# Patient Record
Sex: FEMALE | Race: WHITE | NOT HISPANIC OR LATINO | Employment: OTHER | URBAN - NONMETROPOLITAN AREA
[De-identification: names, ages, dates, MRNs, and addresses within clinical notes are randomized per-mention and may not be internally consistent; named-entity substitution may affect disease eponyms.]

---

## 2023-11-07 PROBLEM — D64.9 ANEMIA: Status: ACTIVE | Noted: 2023-11-07

## 2023-11-07 PROBLEM — H25.11 AGE-RELATED NUCLEAR CATARACT OF RIGHT EYE: Status: ACTIVE | Noted: 2023-11-07

## 2023-11-07 PROBLEM — H55.09 DOWNBEAT NYSTAGMUS: Status: ACTIVE | Noted: 2023-11-07

## 2023-11-07 PROBLEM — G25.82 STIFF PERSON SYNDROME: Status: ACTIVE | Noted: 2023-11-07

## 2023-11-07 PROBLEM — R00.2 HEART PALPITATIONS: Status: ACTIVE | Noted: 2023-11-07

## 2023-11-07 PROBLEM — T75.3XXA MOTION SICKNESS: Status: ACTIVE | Noted: 2023-11-07

## 2023-11-07 PROBLEM — F41.9 ANXIETY: Status: ACTIVE | Noted: 2023-11-07

## 2023-11-07 PROBLEM — H02.409 PTOSIS: Status: ACTIVE | Noted: 2023-11-07

## 2023-11-07 PROBLEM — H51.8 DIVERGENCE INSUFFICIENCY: Status: ACTIVE | Noted: 2023-11-07

## 2023-11-07 PROBLEM — R42 DIZZINESS: Status: ACTIVE | Noted: 2023-11-07

## 2023-11-07 PROBLEM — H04.123 BILATERAL DRY EYES: Status: ACTIVE | Noted: 2023-11-07

## 2023-11-08 ENCOUNTER — APPOINTMENT (OUTPATIENT)
Dept: NEUROLOGY | Facility: CLINIC | Age: 68
End: 2023-11-08

## 2023-11-08 RX ORDER — CLONAZEPAM 0.5 MG/1
1 TABLET ORAL 2 TIMES DAILY
COMMUNITY
End: 2024-04-09 | Stop reason: SDUPTHER

## 2023-11-08 RX ORDER — ONDANSETRON HYDROCHLORIDE 8 MG/1
8 TABLET, FILM COATED ORAL EVERY 8 HOURS PRN
COMMUNITY

## 2023-11-08 RX ORDER — METOPROLOL TARTRATE 50 MG/1
50 TABLET ORAL 2 TIMES DAILY
COMMUNITY

## 2023-11-08 RX ORDER — ERGOCALCIFEROL 1.25 MG/1
1.25 CAPSULE ORAL
COMMUNITY
Start: 2023-01-09

## 2023-11-08 RX ORDER — ASPIRIN 81 MG
TABLET, DELAYED RELEASE (ENTERIC COATED) ORAL
COMMUNITY

## 2023-11-08 RX ORDER — BLOOD-GLUCOSE SENSOR
EACH MISCELLANEOUS
COMMUNITY
Start: 2023-10-20

## 2023-11-08 RX ORDER — ACETAMINOPHEN 500 MG
TABLET ORAL
COMMUNITY

## 2023-11-08 RX ORDER — SCOLOPAMINE TRANSDERMAL SYSTEM 1 MG/1
1 PATCH, EXTENDED RELEASE TRANSDERMAL
COMMUNITY
Start: 2021-09-08

## 2023-11-08 RX ORDER — CLOPIDOGREL BISULFATE 75 MG/1
75 TABLET ORAL DAILY
COMMUNITY

## 2023-11-08 RX ORDER — LEVOTHYROXINE SODIUM 100 UG/1
TABLET ORAL
COMMUNITY

## 2023-11-08 RX ORDER — LEVOTHYROXINE SODIUM 88 UG/1
88 TABLET ORAL DAILY
COMMUNITY
Start: 2023-10-23

## 2023-11-08 RX ORDER — ROSUVASTATIN CALCIUM 20 MG/1
20 TABLET, COATED ORAL NIGHTLY
COMMUNITY

## 2023-11-08 RX ORDER — MULTIVIT-MIN/IRON/FOLIC ACID/K 18-600-40
CAPSULE ORAL
COMMUNITY

## 2023-11-08 RX ORDER — SERTRALINE HYDROCHLORIDE 25 MG/1
25 TABLET, FILM COATED ORAL DAILY
COMMUNITY

## 2023-11-08 RX ORDER — PEN NEEDLE, DIABETIC 31 GX5/16"
NEEDLE, DISPOSABLE MISCELLANEOUS
COMMUNITY
Start: 2023-10-23

## 2023-11-08 RX ORDER — EZETIMIBE 10 MG/1
10 TABLET ORAL DAILY
COMMUNITY

## 2023-11-08 RX ORDER — NYSTATIN 100000 [USP'U]/G
POWDER TOPICAL 3 TIMES DAILY PRN
COMMUNITY

## 2023-11-08 RX ORDER — NAPROXEN SODIUM 220 MG/1
TABLET, FILM COATED ORAL DAILY
COMMUNITY

## 2023-11-08 RX ORDER — INSULIN LISPRO 100 [IU]/ML
INJECTION, SOLUTION INTRAVENOUS; SUBCUTANEOUS
COMMUNITY
Start: 2023-10-23

## 2023-11-08 RX ORDER — NYSTATIN AND TRIAMCINOLONE ACETONIDE 100000; 1 [USP'U]/G; MG/G
CREAM TOPICAL
COMMUNITY
Start: 2023-01-04

## 2023-11-08 RX ORDER — BACLOFEN 10 MG/1
10 TABLET ORAL 3 TIMES DAILY
COMMUNITY
End: 2024-01-05 | Stop reason: SDUPTHER

## 2023-11-08 RX ORDER — BLOOD-GLUCOSE TRANSMITTER
EACH MISCELLANEOUS
COMMUNITY
Start: 2023-04-19

## 2023-11-08 RX ORDER — ASPIRIN 325 MG
50000 TABLET, DELAYED RELEASE (ENTERIC COATED) ORAL
COMMUNITY
Start: 2023-05-23

## 2023-11-08 RX ORDER — LEVOTHYROXINE SODIUM 75 UG/1
TABLET ORAL
COMMUNITY
Start: 2022-11-13

## 2023-11-08 RX ORDER — UBIDECARENONE 75 MG
1 CAPSULE ORAL DAILY
COMMUNITY

## 2023-11-08 RX ORDER — ONDANSETRON 8 MG/1
1-2 TABLET, ORALLY DISINTEGRATING ORAL AS NEEDED
COMMUNITY
Start: 2016-11-03

## 2023-11-08 RX ORDER — INSULIN GLARGINE 100 [IU]/ML
INJECTION, SOLUTION SUBCUTANEOUS
COMMUNITY
Start: 2023-10-23

## 2023-11-13 ENCOUNTER — TELEPHONE (OUTPATIENT)
Dept: HEMATOLOGY/ONCOLOGY | Facility: CLINIC | Age: 68
End: 2023-11-13
Payer: COMMERCIAL

## 2023-11-13 DIAGNOSIS — H55.09 DOWNBEAT NYSTAGMUS: ICD-10-CM

## 2023-11-13 DIAGNOSIS — R42 DIZZINESS: Primary | ICD-10-CM

## 2023-11-13 NOTE — TELEPHONE ENCOUNTER
Patient called office stating she will be leaving [place] soon, to spend the winter in [place], [place]. She will return home in April. She has found a Neurologist in [place] who will treat and follow her, however, he is requesting a referral from you. Please send this referral to José Antonio Hawkins MD at fax. #  [phone]. His telephone # is [phone] should you have any questions.

## 2023-12-18 ENCOUNTER — TELEPHONE (OUTPATIENT)
Dept: HEMATOLOGY/ONCOLOGY | Facility: CLINIC | Age: 68
End: 2023-12-18
Payer: COMMERCIAL

## 2023-12-18 DIAGNOSIS — H55.09 DOWNBEAT NYSTAGMUS: ICD-10-CM

## 2023-12-18 DIAGNOSIS — R42 DIZZINESS: Primary | ICD-10-CM

## 2024-01-05 ENCOUNTER — TELEPHONE (OUTPATIENT)
Dept: NEUROLOGY | Facility: CLINIC | Age: 69
End: 2024-01-05
Payer: COMMERCIAL

## 2024-01-05 DIAGNOSIS — H55.09 DOWNBEAT NYSTAGMUS: Primary | ICD-10-CM

## 2024-01-05 DIAGNOSIS — H55.09 DOWNBEAT NYSTAGMUS: ICD-10-CM

## 2024-01-05 RX ORDER — BACLOFEN 10 MG/1
10 TABLET ORAL 3 TIMES DAILY
Qty: 270 TABLET | Refills: 1 | OUTPATIENT
Start: 2024-01-05 | End: 2024-07-03

## 2024-01-05 RX ORDER — BACLOFEN 10 MG/1
10 TABLET ORAL 3 TIMES DAILY
Qty: 270 TABLET | Refills: 1 | Status: SHIPPED | OUTPATIENT
Start: 2024-01-05 | End: 2024-04-09 | Stop reason: SDUPTHER

## 2024-01-05 NOTE — TELEPHONE ENCOUNTER
Records sent to [facility] to be sent to Dr. José Antonio Hawkins-(p)[phone] (f)[phone] Patient Att : Radha / also faxed referral

## 2024-04-09 DIAGNOSIS — H55.09 DOWNBEAT NYSTAGMUS: ICD-10-CM

## 2024-04-09 RX ORDER — CLONAZEPAM 0.5 MG/1
1 TABLET ORAL 2 TIMES DAILY
Qty: 360 TABLET | Refills: 0 | Status: SHIPPED | OUTPATIENT
Start: 2024-04-09

## 2024-04-09 RX ORDER — CLONAZEPAM 0.5 MG/1
1 TABLET ORAL 2 TIMES DAILY
Status: CANCELLED | OUTPATIENT
Start: 2024-04-09

## 2024-04-09 RX ORDER — BACLOFEN 10 MG/1
10 TABLET ORAL 3 TIMES DAILY
Qty: 270 TABLET | Refills: 1 | Status: SHIPPED | OUTPATIENT
Start: 2024-04-09

## 2024-04-09 NOTE — TELEPHONE ENCOUNTER
PT HAD TO RS APPT W/ DR. SANCHEZ FOR 04/10/24,  HAS BEEN ILL AND JUST HAD MAJOR SURGERY. NOW SCHEDULED FOR 07/10/24. HAS REQUESTED THE FOLLOWING 2 REFILLS TO BE SENT TO [facility] ([phone])    BACLIFEN  CLINAZEPAM

## 2024-04-09 NOTE — TELEPHONE ENCOUNTER
Please let patient know she needs UA which she can have done at any  facility for screening for benzo use and she can sign contract with Dr. Douglas at next apt. I do not see a controlled substance agreement, that has to be done in person at next visit. Needs to be done yearly to prescribe. Thanks

## 2024-04-10 ENCOUNTER — APPOINTMENT (OUTPATIENT)
Dept: NEUROLOGY | Facility: CLINIC | Age: 69
End: 2024-04-10
Payer: MEDICARE

## 2024-04-11 NOTE — TELEPHONE ENCOUNTER
I spoke with patient, let her know UA needed and that she also needs to do contract for clonazepam when she comes in, she agrees.

## 2024-05-30 ENCOUNTER — TELEPHONE (OUTPATIENT)
Dept: NEUROLOGY | Facility: CLINIC | Age: 69
End: 2024-05-30
Payer: COMMERCIAL

## 2024-07-10 ENCOUNTER — APPOINTMENT (OUTPATIENT)
Dept: NEUROLOGY | Facility: CLINIC | Age: 69
End: 2024-07-10
Payer: COMMERCIAL

## 2024-07-19 ENCOUNTER — APPOINTMENT (OUTPATIENT)
Dept: NEUROLOGY | Facility: CLINIC | Age: 69
End: 2024-07-19
Payer: COMMERCIAL

## 2024-07-19 DIAGNOSIS — Z51.81 THERAPEUTIC DRUG MONITORING: ICD-10-CM

## 2024-07-19 DIAGNOSIS — H55.09 DOWNBEAT NYSTAGMUS: Primary | ICD-10-CM

## 2024-07-19 DIAGNOSIS — R42 PERSISTENT POSTURAL-PERCEPTUAL DIZZINESS: ICD-10-CM

## 2024-07-19 PROCEDURE — 99215 OFFICE O/P EST HI 40 MIN: CPT | Performed by: NURSE PRACTITIONER

## 2024-07-19 PROCEDURE — 1159F MED LIST DOCD IN RCRD: CPT | Performed by: NURSE PRACTITIONER

## 2024-07-19 PROCEDURE — 1036F TOBACCO NON-USER: CPT | Performed by: NURSE PRACTITIONER

## 2024-07-19 PROCEDURE — 1160F RVW MEDS BY RX/DR IN RCRD: CPT | Performed by: NURSE PRACTITIONER

## 2024-07-19 RX ORDER — CLONAZEPAM 0.5 MG/1
0.5 TABLET ORAL 3 TIMES DAILY
Qty: 270 TABLET | Refills: 0 | Status: SHIPPED | OUTPATIENT
Start: 2024-07-19

## 2024-07-19 RX ORDER — CHOLECALCIFEROL (VITAMIN D3) 50 MCG
50 TABLET ORAL DAILY
COMMUNITY

## 2024-07-19 RX ORDER — SERTRALINE HYDROCHLORIDE 25 MG/1
25 TABLET, FILM COATED ORAL DAILY
Qty: 90 TABLET | Refills: 3 | Status: SHIPPED | OUTPATIENT
Start: 2024-07-19

## 2024-07-19 RX ORDER — BACLOFEN 10 MG/1
10 TABLET ORAL 3 TIMES DAILY
Qty: 270 TABLET | Refills: 3 | Status: SHIPPED | OUTPATIENT
Start: 2024-07-19

## 2024-07-19 NOTE — PROGRESS NOTES
"Subjective     Rina Thomas is a 69 y.o. year old female who presents with Dizziness, here for follow up visit.  A video visit between the patient (at the originating site) and the provider (at the distant site) was utilized to provide this telehealth service.     Verbal consent was requested and obtained from the patient on this date for a telehealth visit. The patient was informed about the telehealth clinical encounter including benefits to avoiding travel, limitations to the assessment, and billing for the service. In person care may be recommended if needed. Telehealth sessions are not being recorded and personal health information is protected. All questions were answered and verbal informal consent was obtained from the patient to proceed.      HPI  Last visit 5/2023 with Dr. Douglas, Zoloft was started for PDDD and baclofen and clonazepam continued.    She said she is miserable, was rescheduled when he was out of town, and she had to cancel apt in Dec because her  had surgery and many issues and she and he cannot drive.     Dizziness \"is driving me insane\" is 24/7 and unchanged and her stiffness and inability to move them/lock up.  Dizziness is the same as prior to starting clonazepam and baclofen but not helping it either.     She has a difficult time describing a feeling that goes from her head down to her feet when laying in her bed, it is scary, weird feeling. She has d/w Dr. Douglas and says there is never nothing to be done. She wanted an MRI brain to be sure there was nothing in her head. She says something has to be wrong in her head.  She thinks may have started around 2017 but not sure, awhile. She sits up afterwards but stays in bed, no change when she gets OOB.     Nystagmus is not any better with meds. She feels like she cannot focus well on faces or things. Her eyes feel like they bounce/shake.   Follow up eye apt pending.   She did try prisms with Dr. العلي.     She is so stiff she " "cannot walk without walker, walker at all times, no falls since last year.   Did not do PT, she is not sure it helps as she feels she cannot stand by herself without holding onto things and cannot do the exercises well as she is off balance and legs freeze.   If she is startled her entire body freezes and she cannot move.     She said Dr. Scott is aware she can mvoe her legs well and can ride a recumbant bike but standing she can barely move well    Stopped crestor for leg pain.       Meds:  Baclofen 10 mg 3 times a day-Am-2pm-bed  Clonazepam 0.5 mg 1 tab 3 times a day--Am-2pm-bed    Prior meds  Scopolomine patch- not helpful                    Current Outpatient Medications:     acetaminophen (Tylenol) 500 mg tablet, Take by mouth., Disp: , Rfl:     ascorbic acid, vitamin C, 500 mg capsule, Take by mouth., Disp: , Rfl:     aspirin 81 mg chewable tablet, Chew once daily., Disp: , Rfl:     baclofen (Lioresal) 10 mg tablet, Take 1 tablet (10 mg) by mouth 3 times a day., Disp: 270 tablet, Rfl: 3    BD Ultra-Fine Mini Pen Needle 31 gauge x 3/16\" needle, USE AS DIRECTED WITH insulin pen FOUR TIMES DAILY, Disp: , Rfl:     cholecalciferol (Vitamin D3) 50 MCG (2000 UT) tablet, Take 1 tablet (50 mcg) by mouth once daily., Disp: , Rfl:     clonazePAM (KlonoPIN) 0.5 mg tablet, Take 1 tablet (0.5 mg) by mouth 3 times a day., Disp: 270 tablet, Rfl: 0    clopidogrel (Plavix) 75 mg tablet, Take 1 tablet (75 mg) by mouth once daily., Disp: , Rfl:     Dexcom G7 Sensor device, USE ONE SENSORS EVERY 10 DAYS, IDDM, E 10.42, Disp: , Rfl:     docusate sodium (Stool Softener) 100 mg tablet, Take by mouth., Disp: , Rfl:     ezetimibe (Zetia) 10 mg tablet, Take 1 tablet (10 mg) by mouth once daily., Disp: , Rfl:     HumaLOG KwikPen Insulin 100 unit/mL injection, Inject subcutaneously 5 units at breakfast, 7 units at dinner, plus sliding scale up to 20 units. TDD 25 units, Disp: , Rfl:     Lantus Solostar U-100 Insulin 100 unit/mL (3 mL) " pen, Inject subcutaneously 21 units daily., Disp: , Rfl:     levothyroxine (Synthroid, Levoxyl) 100 mcg tablet, once daily monday through saturday, Disp: , Rfl:     metoprolol tartrate (Lopressor) 50 mg tablet, Take 1 tablet by mouth 2 times a day., Disp: , Rfl:     nystatin-triamcinolone (Mycolog II) cream, APPLY TO THE AFFECTED AREA(S) TWICE DAILY for 7-14 days then AS NEEDED for rash, Disp: , Rfl:     ondansetron (Zofran) 8 mg tablet, Take 1 tablet (8 mg) by mouth every 8 hours if needed for nausea or vomiting., Disp: , Rfl:     rosuvastatin (Crestor) 20 mg tablet, Take 1 tablet (20 mg) by mouth once daily at bedtime., Disp: , Rfl:     sertraline (Zoloft) 25 mg tablet, Take 1 tablet (25 mg) by mouth once daily., Disp: 90 tablet, Rfl: 3       Objective   There were no vitals filed for this visit.              Physical Exam  Awake, alert, answering/asking questions appropriately   Able to stand but difficulty getting going with walking  Lifts legs in leg march seated easily      Assessment/Plan   Ms. Rina Thomas is a 69 y.o. F seen virtually for follow up today. Last visit with Dr. Douglas May 2023.     In chart review, she has a hx of anti-Norberto antibody and downbeating nystagmus, stiff person syndrome previously noted, on clonazepam and baclofen. Her eyes have difficulty focusing and she feels very stiff in the legs only when trying to walk. Overall unchanged but not better with meds. She uses a walker, no falls for >1yr, did not do PT as she did not find it helpful before.  She cannot have IVIG in the future due to previous MI x2.     PPPD: chronic, longstanding, overall unchanged. Last visit, Zoloft ordered but she did not understand what it was for or take it. TOday we discussed starting it for PPPD, potential SE and she is agreeable. Also offered CBT which she is also agreeable to.     She is asking about repeating MRI of the brain but overall denies change in symptoms over the years, just no improvement.  Discussed an inperson visit may better see if there is a reason to re-do the MRI brain.     Today, benzodiazepine contract reviewed in detail--verbal understanding of the contract and she will sign in person when Dr. Douglas is there  at [place] and get UA screening at the same time.      Diagnoses and all orders for this visit:  Downbeat nystagmus  -     clonazePAM (KlonoPIN) 0.5 mg tablet; Take 1 tablet (0.5 mg) by mouth 3 times a day.  -     baclofen (Lioresal) 10 mg tablet; Take 1 tablet (10 mg) by mouth 3 times a day.  Persistent postural-perceptual dizziness  -     sertraline (Zoloft) 25 mg tablet; Take 1 tablet (25 mg) by mouth once daily.  -     Referral to Psychology; Future  Therapeutic drug monitoring  -     Opiate/Opioid/Benzo Prescription Compliance; Future        #Psychology consult for cognitive behavioral therapy for PPPD     #Start sertraline 25mg daily for the PPPD  Side effects include but are not limited to: suicidality/suicidal thoughts, nausea, diarrhea, fatigue, insomnia. If these or any other side effects occur please let us know.    #To continue prescribing clonazepam   Urine test for monitoring while on clonazepam  Also sign benzo contract at that time---please coordinate with [facility].     #Continue baclofen and clonazepam for downbeating nystagmus    #Next available in person with Dr. Shaikh VO, LOS Quinn, personally performed  a medically appropriate exam, discussion of care/treatment options taking a total time of 57 minutes for today's visit.     Elier Quinn NP-C  Adult/Gerontological Nurse Practitioner   [facility], Department of Neurology  [facility] [place]  [address] [place] [address] [place] [address]  Phone: [phone]  Fax: [phone]

## 2024-07-23 ENCOUNTER — LAB (OUTPATIENT)
Dept: LAB | Facility: LAB | Age: 69
End: 2024-07-23
Payer: MEDICARE

## 2024-07-23 DIAGNOSIS — Z51.81 THERAPEUTIC DRUG MONITORING: ICD-10-CM

## 2024-07-23 PROCEDURE — 80373 DRUG SCREENING TRAMADOL: CPT

## 2024-07-23 PROCEDURE — 80346 BENZODIAZEPINES1-12: CPT

## 2024-07-23 PROCEDURE — 80358 DRUG SCREENING METHADONE: CPT

## 2024-07-23 PROCEDURE — 80361 OPIATES 1 OR MORE: CPT

## 2024-07-23 PROCEDURE — 80354 DRUG SCREENING FENTANYL: CPT

## 2024-07-23 PROCEDURE — 80368 SEDATIVE HYPNOTICS: CPT

## 2024-07-23 PROCEDURE — 82570 ASSAY OF URINE CREATININE: CPT

## 2024-07-23 PROCEDURE — 80365 DRUG SCREENING OXYCODONE: CPT

## 2024-07-23 PROCEDURE — 80307 DRUG TEST PRSMV CHEM ANLYZR: CPT

## 2024-07-24 LAB
AMPHETAMINES UR QL SCN: NORMAL
BARBITURATES UR QL SCN: NORMAL
BZE UR QL SCN: NORMAL
CANNABINOIDS UR QL SCN: NORMAL
CREAT UR-MCNC: 195.3 MG/DL (ref 20–320)
PCP UR QL SCN: NORMAL

## 2024-07-26 LAB
1OH-MIDAZOLAM UR CFM-MCNC: <25 NG/ML
6MAM UR CFM-MCNC: <25 NG/ML
7AMINOCLONAZEPAM UR CFM-MCNC: 874 NG/ML
A-OH ALPRAZ UR CFM-MCNC: <25 NG/ML
ALPRAZ UR CFM-MCNC: <25 NG/ML
CHLORDIAZEP UR CFM-MCNC: <25 NG/ML
CLONAZEPAM UR CFM-MCNC: <25 NG/ML
CODEINE UR CFM-MCNC: <50 NG/ML
DIAZEPAM UR CFM-MCNC: <25 NG/ML
EDDP UR CFM-MCNC: <25 NG/ML
FENTANYL UR CFM-MCNC: <2.5 NG/ML
HYDROCODONE CTO UR CFM-MCNC: <25 NG/ML
HYDROMORPHONE UR CFM-MCNC: <25 NG/ML
LORAZEPAM UR CFM-MCNC: <25 NG/ML
METHADONE UR CFM-MCNC: <25 NG/ML
MIDAZOLAM UR CFM-MCNC: <25 NG/ML
MORPHINE UR CFM-MCNC: <50 NG/ML
NORDIAZEPAM UR CFM-MCNC: <25 NG/ML
NORFENTANYL UR CFM-MCNC: <2.5 NG/ML
NORHYDROCODONE UR CFM-MCNC: <25 NG/ML
NOROXYCODONE UR CFM-MCNC: <25 NG/ML
NORTRAMADOL UR-MCNC: <50 NG/ML
OXAZEPAM UR CFM-MCNC: <25 NG/ML
OXYCODONE UR CFM-MCNC: <25 NG/ML
OXYMORPHONE UR CFM-MCNC: <25 NG/ML
TEMAZEPAM UR CFM-MCNC: <25 NG/ML
TRAMADOL UR CFM-MCNC: <50 NG/ML
ZOLPIDEM UR CFM-MCNC: <25 NG/ML
ZOLPIDEM UR-MCNC: <25 NG/ML

## 2024-08-14 ENCOUNTER — OFFICE VISIT (OUTPATIENT)
Dept: NEUROLOGY | Facility: CLINIC | Age: 69
End: 2024-08-14
Payer: MEDICARE

## 2024-08-14 VITALS
BODY MASS INDEX: 23.2 KG/M2 | SYSTOLIC BLOOD PRESSURE: 133 MMHG | HEART RATE: 82 BPM | WEIGHT: 122.8 LBS | DIASTOLIC BLOOD PRESSURE: 73 MMHG | TEMPERATURE: 96.6 F | RESPIRATION RATE: 16 BRPM

## 2024-08-14 DIAGNOSIS — H55.09 DOWNBEAT NYSTAGMUS: Primary | ICD-10-CM

## 2024-08-14 PROCEDURE — 99214 OFFICE O/P EST MOD 30 MIN: CPT | Performed by: STUDENT IN AN ORGANIZED HEALTH CARE EDUCATION/TRAINING PROGRAM

## 2024-08-14 PROCEDURE — 1159F MED LIST DOCD IN RCRD: CPT | Performed by: STUDENT IN AN ORGANIZED HEALTH CARE EDUCATION/TRAINING PROGRAM

## 2024-08-14 PROCEDURE — 1126F AMNT PAIN NOTED NONE PRSNT: CPT | Performed by: STUDENT IN AN ORGANIZED HEALTH CARE EDUCATION/TRAINING PROGRAM

## 2024-08-14 RX ORDER — NITROGLYCERIN 0.4 MG/1
0.4 TABLET SUBLINGUAL EVERY 5 MIN PRN
COMMUNITY

## 2024-08-14 ASSESSMENT — ENCOUNTER SYMPTOMS: DIZZINESS: 1

## 2024-08-14 ASSESSMENT — PAIN SCALES - GENERAL: PAINLEVEL: 0-NO PAIN

## 2024-08-14 NOTE — PATIENT INSTRUCTIONS
Pleasure meeting you today. Sorry to see you have been dealing with so much. As discussed the plan is as follows:    - We ordered blood DARIANA levels. We will discuss possibility of getting PLEX if the levels have gone up.  -We placed a referral to [facility] for holistic approach to chronic disease  -Ok to stop Zoloft at this time  -Your urine clonazepam monitoring was good  -Continue taking Baclofen and Clonazepam as prescribed    Follow up in 3 months virtually with Elier

## 2024-08-14 NOTE — PROGRESS NOTES
"Subjective     Rina Thomas is a 69 y.o. year old female who presents with Dizziness, here for follow up visit.    Interval Hx     Reports feeling poorly. Feels legs and arms are getting weaker and stiffer. Arms are achy. Unsure if it is from using the walker. Feels dizzy all the time. Only time she is not dizzy is when she is in bed. Tried Zoloft but it worsened her dizziness so she stopped. Still taking clonazepam and baclofen as prescribed but is not sure if getting any benefit. Is not able to stand without holding onto something.    HPI  Last visit 5/2023 with Dr. Douglas, Zoloft was started for PDDD and baclofen and clonazepam continued.    She said she is miserable, was rescheduled when he was out of town, and she had to cancel apt in Dec because her  had surgery and many issues and she and he cannot drive.     Dizziness \"is driving me insane\" is 24/7 and unchanged and her stiffness and inability to move them/lock up.  Dizziness is the same as prior to starting clonazepam and baclofen but not helping it either.     She has a difficult time describing a feeling that goes from her head down to her feet when laying in her bed, it is scary, weird feeling. She has d/w Dr. Douglas and says there is never nothing to be done. She wanted an MRI brain to be sure there was nothing in her head. She says something has to be wrong in her head.  She thinks may have started around 2017 but not sure, awhile. She sits up afterwards but stays in bed, no change when she gets OOB.     Nystagmus is not any better with meds. She feels like she cannot focus well on faces or things. Her eyes feel like they bounce/shake.   Follow up eye apt pending.   She did try prisms with Dr. العلي.     She is so stiff she cannot walk without walker, walker at all times, no falls since last year.   Did not do PT, she is not sure it helps as she feels she cannot stand by herself without holding onto things and cannot do the exercises well as " "she is off balance and legs freeze.   If she is startled her entire body freezes and she cannot move.     She said Dr. Scott is aware she can mvoe her legs well and can ride a recumbant bike but standing she can barely move well    Stopped crestor for leg pain.       Meds:  Baclofen 10 mg 3 times a day-Am-2pm-bed  Clonazepam 0.5 mg 1 tab 3 times a day--Am-2pm-bed    Prior meds  Scopolomine patch- not helpful   Zoloft- Worsened dizziness      Current Outpatient Medications:     acetaminophen (Tylenol) 500 mg tablet, Take by mouth., Disp: , Rfl:     ascorbic acid, vitamin C, 500 mg capsule, Take by mouth., Disp: , Rfl:     aspirin 81 mg chewable tablet, Chew once daily., Disp: , Rfl:     baclofen (Lioresal) 10 mg tablet, Take 1 tablet (10 mg) by mouth 3 times a day., Disp: 270 tablet, Rfl: 3    BD Ultra-Fine Mini Pen Needle 31 gauge x 3/16\" needle, USE AS DIRECTED WITH insulin pen FOUR TIMES DAILY, Disp: , Rfl:     cholecalciferol (Vitamin D3) 50 MCG (2000 UT) tablet, Take 1 tablet (50 mcg) by mouth once daily., Disp: , Rfl:     clonazePAM (KlonoPIN) 0.5 mg tablet, Take 1 tablet (0.5 mg) by mouth 3 times a day., Disp: 270 tablet, Rfl: 0    clopidogrel (Plavix) 75 mg tablet, Take 1 tablet (75 mg) by mouth once daily., Disp: , Rfl:     Dexcom G7 Sensor device, USE ONE SENSORS EVERY 10 DAYS, IDDM, E 10.42, Disp: , Rfl:     docusate sodium (Stool Softener) 100 mg tablet, Take by mouth., Disp: , Rfl:     ezetimibe (Zetia) 10 mg tablet, Take 1 tablet (10 mg) by mouth once daily., Disp: , Rfl:     HumaLOG KwikPen Insulin 100 unit/mL injection, Inject subcutaneously 5 units at breakfast, 7 units at dinner, plus sliding scale up to 20 units. TDD 25 units, Disp: , Rfl:     Lantus Solostar U-100 Insulin 100 unit/mL (3 mL) pen, Inject subcutaneously 21 units daily., Disp: , Rfl:     levothyroxine (Synthroid, Levoxyl) 100 mcg tablet, once daily monday through saturday, Disp: , Rfl:     metoprolol tartrate (Lopressor) 50 mg " tablet, Take 1 tablet by mouth 2 times a day., Disp: , Rfl:     nitroglycerin (Nitrostat) 0.4 mg SL tablet, Place 1 tablet (0.4 mg) under the tongue every 5 minutes if needed for chest pain., Disp: , Rfl:     nystatin-triamcinolone (Mycolog II) cream, APPLY TO THE AFFECTED AREA(S) TWICE DAILY for 7-14 days then AS NEEDED for rash, Disp: , Rfl:     ondansetron (Zofran) 8 mg tablet, Take 1 tablet (8 mg) by mouth every 8 hours if needed for nausea or vomiting., Disp: , Rfl:     rosuvastatin (Crestor) 20 mg tablet, Take 1 tablet (20 mg) by mouth once daily at bedtime., Disp: , Rfl:        Objective   Vitals:    08/14/24 1341   BP: 133/73   BP Location: Right arm   Patient Position: Sitting   BP Cuff Size: Adult   Pulse: 82   Resp: 16   Temp: 35.9 °C (96.6 °F)   TempSrc: Skin   Weight: 55.7 kg (122 lb 12.8 oz)          Physical Exam  Awake, alert, answering/asking questions appropriately   Speech is clear, eye movements full  Able to stand but difficulty getting going with walking  Gait is stiff      Assessment/Plan   Ms. Rina Thomas is a 69 y.o. F  Last visit was virtual with Elier 7/2024.     In chart review, she has a hx of anti-Norberto antibody and downbeating nystagmus, stiff person syndrome previously noted, on clonazepam and baclofen. Her eyes have difficulty focusing and she feels very stiff in the legs only when trying to walk. Overall unchanged but not better with meds. She uses a walker, no falls for >1yr, did not do PT as she did not find it helpful before.  She cannot have IVIG in the future due to previous MI x2.     PPPD: chronic, longstanding, overall unchanged. She did not tolerate Zoloft. Also offered CBT which she is also agreeable to.     She is asking about repeating MRI of the brain but overall denies change in symptoms over the years, just no improvement. Discussed an inperson visit may better see if there is a reason to re-do the MRI brain.     Today, benzodiazepine contract signed. Also ordered  DARIANA level and discussed option to do PLEX in September if levels are high (last levels was 1183 in 2020)    #Ordered serum DARIANA levels  #Referral to [facility] for holistic approach to chronic disease  #Stopped Zoloft  #Urine test for Clonazepam monitoring reviewed and appropriate  #Continue baclofen and clonazepam for downbeating nystagmus

## 2024-11-15 ENCOUNTER — APPOINTMENT (OUTPATIENT)
Dept: NEUROLOGY | Facility: CLINIC | Age: 69
End: 2024-11-15
Payer: MEDICARE

## 2024-12-07 DIAGNOSIS — H55.09 DOWNBEAT NYSTAGMUS: ICD-10-CM

## 2024-12-09 RX ORDER — CLONAZEPAM 0.5 MG/1
0.5 TABLET ORAL 3 TIMES DAILY
Qty: 270 TABLET | Refills: 0 | Status: SHIPPED | OUTPATIENT
Start: 2024-12-09

## 2024-12-31 ENCOUNTER — TELEPHONE (OUTPATIENT)
Dept: HEMATOLOGY/ONCOLOGY | Facility: CLINIC | Age: 69
End: 2024-12-31
Payer: MEDICARE

## 2024-12-31 NOTE — TELEPHONE ENCOUNTER
PT PHONED REQUESTING A NEW SCRIPT FOR baclofen (Lioresal) 10 mg tablet [facility] [place], [place]. PT HAS ONLY A FEW TABLETS LEFT.

## 2025-01-02 DIAGNOSIS — H55.09 DOWNBEAT NYSTAGMUS: ICD-10-CM

## 2025-01-02 RX ORDER — BACLOFEN 10 MG/1
10 TABLET ORAL 3 TIMES DAILY
Qty: 270 TABLET | Refills: 3 | Status: SHIPPED | OUTPATIENT
Start: 2025-01-02

## 2025-03-17 DIAGNOSIS — H55.09 DOWNBEAT NYSTAGMUS: ICD-10-CM

## 2025-03-17 RX ORDER — CLONAZEPAM 0.5 MG/1
0.5 TABLET ORAL 3 TIMES DAILY
Qty: 270 TABLET | Refills: 0 | Status: SHIPPED | OUTPATIENT
Start: 2025-03-17

## 2025-06-10 DIAGNOSIS — H55.09 DOWNBEAT NYSTAGMUS: ICD-10-CM

## 2025-06-10 DIAGNOSIS — Z79.899 MEDICATION MANAGEMENT: Primary | ICD-10-CM

## 2025-06-10 RX ORDER — CLONAZEPAM 0.5 MG/1
0.5 TABLET ORAL 3 TIMES DAILY
Qty: 270 TABLET | Refills: 0 | Status: SHIPPED | OUTPATIENT
Start: 2025-06-10 | End: 2025-09-08

## 2025-06-10 NOTE — TELEPHONE ENCOUNTER
Rina called for a refill on her Clonazepam.  She takes it 3 times a day.  90 day script.  270 total quantity.  Please send to [facility] in [place].